# Patient Record
Sex: FEMALE | Race: WHITE | NOT HISPANIC OR LATINO | URBAN - METROPOLITAN AREA
[De-identification: names, ages, dates, MRNs, and addresses within clinical notes are randomized per-mention and may not be internally consistent; named-entity substitution may affect disease eponyms.]

---

## 2018-09-02 ENCOUNTER — HOSPITAL ENCOUNTER (EMERGENCY)
Facility: HOSPITAL | Age: 28
Discharge: HOME | End: 2018-09-02
Attending: EMERGENCY MEDICINE

## 2018-09-02 VITALS
DIASTOLIC BLOOD PRESSURE: 100 MMHG | TEMPERATURE: 98 F | BODY MASS INDEX: 29.57 KG/M2 | WEIGHT: 184 LBS | HEIGHT: 66 IN | HEART RATE: 82 BPM | SYSTOLIC BLOOD PRESSURE: 136 MMHG | RESPIRATION RATE: 20 BRPM | OXYGEN SATURATION: 97 %

## 2018-09-02 DIAGNOSIS — O20.9 VAGINAL BLEEDING AFFECTING EARLY PREGNANCY: Primary | ICD-10-CM

## 2018-09-02 LAB
BILIRUB UR QL STRIP: NEGATIVE
CLARITY UR: CLEAR
COLOR UR: YELLOW
GLUCOSE UR QL STRIP: NEGATIVE
HCG INTACT+B SERPL-ACNC: 2059 MIU/ML
HGB UR QL STRIP: ABNORMAL
KETONES UR QL STRIP: NEGATIVE
LEUKOCYTE ESTERASE UR QL STRIP: ABNORMAL
MICROSCOPIC COMMENT: ABNORMAL
NITRITE UR QL STRIP: NEGATIVE
PH UR STRIP: 6 [PH] (ref 5–8)
PROT UR QL STRIP: NEGATIVE
RBC #/AREA URNS HPF: 5 /HPF (ref 0–4)
SP GR UR STRIP: <=1.005 (ref 1–1.03)
SQUAMOUS #/AREA URNS HPF: 5 /HPF
URN SPEC COLLECT METH UR: 1
UROBILINOGEN UR STRIP-ACNC: NEGATIVE EU/DL
WBC #/AREA URNS HPF: 3 /HPF (ref 0–5)

## 2018-09-02 PROCEDURE — 36415 COLL VENOUS BLD VENIPUNCTURE: CPT

## 2018-09-02 PROCEDURE — 99283 EMERGENCY DEPT VISIT LOW MDM: CPT

## 2018-09-02 PROCEDURE — 81000 URINALYSIS NONAUTO W/SCOPE: CPT

## 2018-09-02 PROCEDURE — 84702 CHORIONIC GONADOTROPIN TEST: CPT

## 2018-09-02 NOTE — ED PROVIDER NOTES
Encounter Date: 9/2/2018       History     Chief Complaint   Patient presents with    Vaginal Bleeding     7 weeks pregnant     This is a 28-year-old white female here with complaint of vaginal bleeding for a week worsening the past day.  She states she is approximately 7-8 weeks pregnant seen by her Ob week ago she denies any trauma denies any pain other than mild cramping.          Review of patient's allergies indicates:  No Known Allergies  History reviewed. No pertinent past medical history.  History reviewed. No pertinent surgical history.  History reviewed. No pertinent family history.  Social History     Tobacco Use    Smoking status: Current Every Day Smoker   Substance Use Topics    Alcohol use: No     Frequency: Never    Drug use: No     Review of Systems   Genitourinary: Positive for vaginal bleeding.   All other systems reviewed and are negative.      Physical Exam     Initial Vitals [09/02/18 1432]   BP Pulse Resp Temp SpO2   (!) 136/100 82 20 98.1 °F (36.7 °C) 97 %      MAP       --         Physical Exam    Nursing note and vitals reviewed.  Constitutional: She appears well-developed and well-nourished.   HENT:   Head: Normocephalic and atraumatic.   Right Ear: External ear normal.   Left Ear: External ear normal.   Nose: Nose normal.   Mouth/Throat: Oropharynx is clear and moist.   Eyes: Conjunctivae and EOM are normal. Pupils are equal, round, and reactive to light.   Neck: Normal range of motion. Neck supple. No thyromegaly present.   Cardiovascular: Normal rate, regular rhythm, normal heart sounds and intact distal pulses.   No murmur heard.  Pulmonary/Chest: Breath sounds normal. No stridor. No respiratory distress. She has no wheezes. She has no rales.   Abdominal: Soft. Bowel sounds are normal. There is no tenderness. There is no rebound and no guarding.   Musculoskeletal: Normal range of motion.   Lymphadenopathy:     She has no cervical adenopathy.   Neurological: She is alert and  oriented to person, place, and time. She has normal strength and normal reflexes. No cranial nerve deficit.   Skin: Skin is warm and dry. Capillary refill takes less than 2 seconds.   Psychiatric: She has a normal mood and affect. Her behavior is normal. Judgment and thought content normal.         ED Course   Procedures  Labs Reviewed   HCG, QUANTITATIVE, PREGNANCY   URINALYSIS, REFLEX TO URINE CULTURE          Imaging Results    None                               Clinical Impression:   The encounter diagnosis was Vaginal bleeding affecting early pregnancy.                             Mj Blanco MD  09/03/18 0690

## 2018-09-02 NOTE — DISCHARGE INSTRUCTIONS
Patient return tomorrowFor 2nd HCG and comparison.  THANK YOU for allowing us to care for you today. We sincerely hope your experience in the ER was pleasant and that you received the care you felt you needed. Most importantly, we hope that you feel better soon.

## 2018-09-03 ENCOUNTER — HOSPITAL ENCOUNTER (EMERGENCY)
Facility: HOSPITAL | Age: 28
Discharge: HOME | End: 2018-09-03
Attending: FAMILY MEDICINE

## 2018-09-03 VITALS
OXYGEN SATURATION: 98 % | BODY MASS INDEX: 27.97 KG/M2 | HEIGHT: 66 IN | DIASTOLIC BLOOD PRESSURE: 72 MMHG | WEIGHT: 174 LBS | RESPIRATION RATE: 18 BRPM | HEART RATE: 77 BPM | SYSTOLIC BLOOD PRESSURE: 126 MMHG | TEMPERATURE: 98 F

## 2018-09-03 DIAGNOSIS — O20.0 THREATENED ABORTION: Primary | ICD-10-CM

## 2018-09-03 LAB — HCG INTACT+B SERPL-ACNC: 2318 MIU/ML

## 2018-09-03 PROCEDURE — 99284 EMERGENCY DEPT VISIT MOD MDM: CPT

## 2018-09-03 PROCEDURE — 76801 OB US < 14 WKS SINGLE FETUS: CPT | Mod: 26,,, | Performed by: RADIOLOGY

## 2018-09-03 PROCEDURE — 76801 OB US < 14 WKS SINGLE FETUS: CPT | Mod: TC

## 2018-09-03 PROCEDURE — 84702 CHORIONIC GONADOTROPIN TEST: CPT

## 2018-09-03 PROCEDURE — 76817 TRANSVAGINAL US OBSTETRIC: CPT | Mod: 26,,, | Performed by: RADIOLOGY

## 2018-09-03 NOTE — ED NOTES
Further plan of care discussed with pt. Pt provided with water to drink. No other needs voiced. Will continue to monitor.

## 2018-09-03 NOTE — ED PROVIDER NOTES
Encounter Date: 9/3/2018       History     Chief Complaint   Patient presents with    Labs Only     Patient was seen in this ED yesterday for vaginal bleeding. She is approx 7-8 weeks pregnant. She had quant HCG yesterday and was instructed to return to ED today for repeat quant. She states she continues to notice bright red blood when she wipes after urination but denies having to use a pad. She denies abdominal or pelvic pain. She denies urinary symptoms. She denies fever or chills. .           Review of patient's allergies indicates:  No Known Allergies  History reviewed. No pertinent past medical history.  History reviewed. No pertinent surgical history.  History reviewed. No pertinent family history.  Social History     Tobacco Use    Smoking status: Current Every Day Smoker   Substance Use Topics    Alcohol use: No     Frequency: Never    Drug use: No     Review of Systems   Constitutional: Negative for chills and fever.   HENT: Negative for congestion.    Respiratory: Negative for cough.    Cardiovascular: Negative for leg swelling.   Gastrointestinal: Negative for abdominal pain, diarrhea, nausea and vomiting.   Genitourinary: Positive for vaginal bleeding. Negative for difficulty urinating, dysuria, flank pain, pelvic pain and vaginal discharge.   Neurological: Negative for light-headedness.   All other systems reviewed and are negative.      Physical Exam     Initial Vitals [18 1248]   BP Pulse Resp Temp SpO2   119/86 77 16 98.8 °F (37.1 °C) 97 %      MAP       --         Physical Exam    Nursing note and vitals reviewed.  Constitutional: She appears well-developed and well-nourished. She is not diaphoretic. No distress.   HENT:   Head: Normocephalic and atraumatic.   Eyes: Right eye exhibits no discharge. Left eye exhibits no discharge. No scleral icterus.   Neck: Normal range of motion. No JVD present.   Cardiovascular: Normal rate, regular rhythm, normal heart sounds and intact distal  pulses.   No murmur heard.  Pulmonary/Chest: Breath sounds normal. No respiratory distress.   Abdominal: Soft. Bowel sounds are normal. She exhibits no distension. There is no tenderness.   Musculoskeletal: Normal range of motion. She exhibits no edema or tenderness.   Neurological: She is alert and oriented to person, place, and time.   Skin: Skin is warm and dry. Capillary refill takes less than 2 seconds.   Psychiatric: She has a normal mood and affect.         ED Course   Procedures  Labs Reviewed   HCG, QUANTITATIVE, PREGNANCY          Imaging Results    None                       Attending Attestation:   Physician Attestation Statement for Resident:  As the supervising MD   Physician Attestation Statement: I have personally seen and examined this patient.   I agree with the above history. -:   As the supervising MD I agree with the above PE.    As the supervising MD I agree with the above treatment, course, plan, and disposition.  I have reviewed and agree with the residents interpretation of the following: lab data.  I have reviewed the following: old records at this facility.                    ED Course as of Sep 03 163   Mon Sep 03, 2018   1420 hCG Quant: [id number] [KR]      ED Course User Index  [KR] Amy Rossi NP     Clinical Impression:   The encounter diagnosis was Threatened .                             Amy Rossi NP  [id number]